# Patient Record
Sex: FEMALE | Race: WHITE
[De-identification: names, ages, dates, MRNs, and addresses within clinical notes are randomized per-mention and may not be internally consistent; named-entity substitution may affect disease eponyms.]

---

## 2019-04-09 ENCOUNTER — HOSPITAL ENCOUNTER (OUTPATIENT)
Dept: HOSPITAL 5 - OR | Age: 69
Discharge: HOME | End: 2019-04-09
Attending: UROLOGY
Payer: MEDICARE

## 2019-04-09 VITALS — SYSTOLIC BLOOD PRESSURE: 147 MMHG | DIASTOLIC BLOOD PRESSURE: 88 MMHG

## 2019-04-09 DIAGNOSIS — Z79.82: ICD-10-CM

## 2019-04-09 DIAGNOSIS — Z79.899: ICD-10-CM

## 2019-04-09 DIAGNOSIS — Z87.440: ICD-10-CM

## 2019-04-09 DIAGNOSIS — M19.90: ICD-10-CM

## 2019-04-09 DIAGNOSIS — K21.9: ICD-10-CM

## 2019-04-09 DIAGNOSIS — Z96.653: ICD-10-CM

## 2019-04-09 DIAGNOSIS — Z90.49: ICD-10-CM

## 2019-04-09 DIAGNOSIS — E66.9: ICD-10-CM

## 2019-04-09 DIAGNOSIS — Z90.710: ICD-10-CM

## 2019-04-09 DIAGNOSIS — Z88.8: ICD-10-CM

## 2019-04-09 DIAGNOSIS — Z53.8: ICD-10-CM

## 2019-04-09 DIAGNOSIS — N32.81: Primary | ICD-10-CM

## 2019-04-09 DIAGNOSIS — D64.9: ICD-10-CM

## 2019-04-09 DIAGNOSIS — J45.909: ICD-10-CM

## 2019-04-09 DIAGNOSIS — F32.9: ICD-10-CM

## 2019-04-09 DIAGNOSIS — I10: ICD-10-CM

## 2019-04-09 LAB
BASOPHILS # (AUTO): 0 K/MM3 (ref 0–0.1)
BASOPHILS NFR BLD AUTO: 0.5 % (ref 0–1.8)
BUN SERPL-MCNC: 21 MG/DL (ref 7–17)
BUN/CREAT SERPL: 21 %
CALCIUM SERPL-MCNC: 9.1 MG/DL (ref 8.4–10.2)
EOSINOPHIL # BLD AUTO: 0.4 K/MM3 (ref 0–0.4)
EOSINOPHIL NFR BLD AUTO: 6.2 % (ref 0–4.3)
HCT VFR BLD CALC: 37.6 % (ref 30.3–42.9)
HEMOLYSIS INDEX: 3
HGB BLD-MCNC: 12.5 GM/DL (ref 10.1–14.3)
LYMPHOCYTES # BLD AUTO: 1.6 K/MM3 (ref 1.2–5.4)
LYMPHOCYTES NFR BLD AUTO: 24.2 % (ref 13.4–35)
MCHC RBC AUTO-ENTMCNC: 33 % (ref 30–34)
MCV RBC AUTO: 91 FL (ref 79–97)
MONOCYTES # (AUTO): 0.6 K/MM3 (ref 0–0.8)
MONOCYTES % (AUTO): 9.4 % (ref 0–7.3)
PLATELET # BLD: 262 K/MM3 (ref 140–440)
RBC # BLD AUTO: 4.13 M/MM3 (ref 3.65–5.03)

## 2019-04-09 PROCEDURE — 85025 COMPLETE CBC W/AUTO DIFF WBC: CPT

## 2019-04-09 PROCEDURE — 36415 COLL VENOUS BLD VENIPUNCTURE: CPT

## 2019-04-09 PROCEDURE — 80048 BASIC METABOLIC PNL TOTAL CA: CPT

## 2019-04-09 NOTE — ANESTHESIA CONSULTATION
Anesthesia Consult and Med Hx


Date of service: 04/09/19





- Airway


Anesthetic Teeth Evaluation: Partials


ROM Head & Neck: Adequate


Mental/Hyoid Distance: Inadequate


Mallampati Class: Class III


Intubation Access Assessment: Possibly Difficult (significant kyphosis)





- Pulmonary Exam


CTA: Yes





- Cardiac Exam


Cardiac Exam: RRR





- Pre-Operative Health Status


ASA Pre-Surgery Classification: ASA3


Proposed Anesthetic Plan: General





- Pulmonary


Hx Smoking: No


Hx Asthma: Yes (triggered by allergies; last used inhaler today)


Hx Respiratory Symptoms: No


SOB: Yes (SOB WITH ACTIVITY)





- Cardiovascular System


Hx Hypertension: Yes


Hx Heart Attack/AMI: No


Hx Percutaneous Transluminal Coronary Angioplasty (PTCA): No


Hx Valvular Heart Disease: Yes (mild AR, mild to moderate MR)





- Central Nervous System


Hx Seizures: No


CVA: No


Hx Back Pain: Yes (2/2 lumbar compression fractures and osteopenia)


Hx Psychiatric Problems: Yes (depression)





- Gastrointestinal


Hx Gastroesophageal Reflux Disease: Yes (asymptomatic today)





- Endocrine


Hx Renal Disease: No


Hx Liver Disease: No


Hx Insulin Dependent Diabetes: No


Hx Non-Insulin Dependent Diabetes: No


Hx Thyroid Disease: No





- Hematic


Hx Anemia: Yes





- Other Systems


Hx Obesity: Yes





- Additional Comments


Anesthesia Medical History Comments: Cardiology clearance on chart. TTE 4/2018 

shows normal EF, mild AR, mild to moderate MR. ST 5/2018 negative. Recently 

started on metoprolol which she has tolerated well (last dose 4/8/19 PM).

## 2019-04-23 ENCOUNTER — HOSPITAL ENCOUNTER (OUTPATIENT)
Dept: HOSPITAL 5 - OR | Age: 69
Discharge: HOME | End: 2019-04-23
Attending: UROLOGY
Payer: MEDICARE

## 2019-04-23 VITALS — SYSTOLIC BLOOD PRESSURE: 138 MMHG | DIASTOLIC BLOOD PRESSURE: 79 MMHG

## 2019-04-23 DIAGNOSIS — Z79.899: ICD-10-CM

## 2019-04-23 DIAGNOSIS — N32.81: Primary | ICD-10-CM

## 2019-04-23 DIAGNOSIS — Z90.710: ICD-10-CM

## 2019-04-23 DIAGNOSIS — Z79.82: ICD-10-CM

## 2019-04-23 DIAGNOSIS — R35.0: ICD-10-CM

## 2019-04-23 DIAGNOSIS — Z88.2: ICD-10-CM

## 2019-04-23 DIAGNOSIS — Z98.890: ICD-10-CM

## 2019-04-23 DIAGNOSIS — J45.909: ICD-10-CM

## 2019-04-23 DIAGNOSIS — Z96.653: ICD-10-CM

## 2019-04-23 DIAGNOSIS — E66.9: ICD-10-CM

## 2019-04-23 DIAGNOSIS — D64.9: ICD-10-CM

## 2019-04-23 DIAGNOSIS — M19.90: ICD-10-CM

## 2019-04-23 DIAGNOSIS — Z90.49: ICD-10-CM

## 2019-04-23 DIAGNOSIS — Z87.440: ICD-10-CM

## 2019-04-23 DIAGNOSIS — I10: ICD-10-CM

## 2019-04-23 DIAGNOSIS — Z88.8: ICD-10-CM

## 2019-04-23 DIAGNOSIS — K21.9: ICD-10-CM

## 2019-04-23 DIAGNOSIS — F32.9: ICD-10-CM

## 2019-04-23 PROCEDURE — 64595 REV/RMV PRPH SAC/GSTR NPG/R: CPT

## 2019-04-23 PROCEDURE — C1751 CATH, INF, PER/CENT/MIDLINE: HCPCS

## 2019-04-23 PROCEDURE — 64585 REV/RMV PERPH NSTIM ELTRD RA: CPT

## 2019-04-23 PROCEDURE — 88300 SURGICAL PATH GROSS: CPT

## 2019-04-23 PROCEDURE — 88302 TISSUE EXAM BY PATHOLOGIST: CPT

## 2019-04-23 PROCEDURE — 73501 X-RAY EXAM HIP UNI 1 VIEW: CPT

## 2019-04-23 NOTE — ANESTHESIA DAY OF SURGERY
Anesthesia Day of Surgery





- Day of Surgery


Patient Examined: Yes


Patient H&P Reviewed: Yes


Patient is NPO: Yes


Beta Blockers: Yes (Yesterday PM)

## 2019-04-23 NOTE — SHORT STAY SUMMARY
Short Stay Documentation


Date of service: 04/23/19





- History


H&P: obtained from office





- Allergies and Medications


Current Medications: 


                                    Allergies





pregabalin [From Lyrica] Allergy (Verified 04/03/19 10:32)


   Hives


Sulfa (Sulfonamide Antibiotics) Allergy (Verified 04/03/19 10:32)


   Anaphylaxis





                                Home Medications











 Medication  Instructions  Recorded  Confirmed  Last Taken  Type


 


ALBUTEROL Inhaler (OR & NICU) 2 puff IH QID PRN 04/03/19 04/23/19 04/23/19 

History





[Proair]     


 


Aspirin [Aspir-Low] 81 mg PO DAILY 04/03/19 04/23/19 04/08/19 History


 


Calcium Carbonate [Calcium] 500 mg PO DAILY 04/03/19 04/23/19 04/22/19 History


 


Cholecalciferol (Vitamin D3) 50,000 unit PO QWEEK 04/03/19 04/23/19 04/19/19 

History





[Vitamin D3 2,000 UNIT CAP]     


 


Estradiol Vaginal Cream (Nf) 42.5 gm VG DAILY 04/03/19 04/19/19 04/08/19 History





[Estrace Vaginal Cream (Nf)]     


 


FLUoxetine [PROzac] 20 mg PO QDAY 04/03/19 04/19/19 04/08/19 History


 


Ferrous Sulfate [High Potency Iron 27 mg PO DAILY 04/03/19 04/23/19 04/08/19 

History





27 MG]     


 


Fluticasone [Flonase] 1 spray NS QDAY 04/03/19 04/23/19 04/23/19 History


 


Fluticasone/Vilanterol [Breo 1 each IH DAILY 04/03/19 04/23/19 04/23/19 History





Ellipta 100-25 Mcg INH]     


 


Gabapentin [Neurontin] 300 mg PO Q8HR 04/03/19 04/23/19 04/22/19 History


 


Loperamide [Imodium] 2 mg PO TID 04/03/19 04/23/19 04/21/19 History


 


Losartan [Cozaar] 50 mg PO BID 04/03/19 04/23/19 04/23/19 History


 


Magnesium Oxide [Magnesium] 500 mg PO DAILY 04/03/19 04/19/19 04/08/19 History


 


Metoprolol [Lopressor] 25 mg PO BID 04/03/19 04/23/19 04/22/19 History


 


Mirabegron [Myrbetriq] 25 mg PO QDAY 04/03/19 04/19/19 04/08/19 History


 


Montelukast [Singulair] 10 mg PO QPM 04/03/19 04/23/19 04/22/19 History


 


Multivit-Min/Iron/Folic/Lutein 1 each PO DAILY 04/03/19 04/19/19 04/08/19 

History





[Centrum Silver Women Tablet]     


 


Omeprazole 20 mg PO DAILY 04/03/19 04/23/19 04/22/19 History


 


Oxycodone HCl/Acetaminophen 1 each PO Q6HR PRN 04/03/19 04/23/19 04/22/19 

History





[Percocet 10/325 mg]     


 


Potassium Chloride [Klor-Con] 20 meq PO DAILY 04/03/19 04/23/19 04/22/19 History


 


Ranitidine HCl [Zantac 150 MG TAB] 150 mg PO BID 04/03/19 04/23/19 04/22/19 

History


 


hydroCHLOROthiazide [HCTZ] 25 mg PO QDAY 04/03/19 04/23/19 04/22/19 History








Active Medications





Cefazolin Sodium (Ancef/Sterile Water 2 Gm/20 Ml)  2 gm IV PREOP NR


   Stop: 04/23/19 17:30


Famotidine (Pepcid)  20 mg IV PREOP NR


   Stop: 04/23/19 17:30


   Last Admin: 04/23/19 14:50 Dose:  20 mg


   Documented by: 


Fentanyl (Sublimaze)  50 mcg IV Q5MIN PRN


   PRN Reason: Pain , Severe (7-10)


   Stop: 04/23/19 23:59


Lactated Ringer's (Lactated Ringers)  1,000 mls @ 75 mls/hr IV AS DIRECT KAYLEEN


   Stop: 04/23/19 19:00


   Last Admin: 04/23/19 14:50 Dose:  75 mls/hr


   Documented by: 


Midazolam HCl (Versed)  2 mg IV PREOP NR


   Stop: 04/23/19 17:30


   Last Admin: 04/23/19 14:58 Dose:  2 mg


   Documented by: 


Ondansetron HCl (Zofran)  4 mg IV ONCE PRN


   PRN Reason: Nausea And Vomiting











- Brief post op/procedure progress note


Date of procedure: 04/23/19


Pre-op diagnosis: OAB  / Interstim


Post-op diagnosis: same


Procedure: 





Removal of Interstim generator and lead 


Findings: 





entire lead all component and generator removed nothing left behind


Surgeon: MARILYN CLIFFORD


Estimated blood loss: minimal


Pathology: list (generator and lead)


Specimen disposition: to lab, other (generator)


Condition: stable





- Hospital course


Hospital course: 





orpacuhome





- Disposition


Condition at discharge: Good


Disposition: DC-01 TO HOME OR SELFCARE





Short Stay Discharge Plan


Activity: advance as tolerated


Diet: advance as tolerated


Follow up with: 


MARILYN CLIFFORD MD [Staff Physician] - 7 Days


Prescriptions: 


HYDROcodone/APAP 5-325 [Norco 5-325 mg TAB] 1 each PO Q4HR PRN #20 tablet


 PRN Reason: Pain

## 2019-04-23 NOTE — ANESTHESIA CONSULTATION
Anesthesia Consult and Med Hx





- Pulmonary


Hx Smoking: No


Hx Asthma: Yes (SEASONAL)


Hx Respiratory Symptoms: No


SOB: Yes (SOB WITH ACTIVITY)


Hx Sleep Apnea: No (JESSICA PRE SCREEN HIGH RISK)





- Cardiovascular System


Hx Hypertension: Yes


Hx Heart Attack/AMI: No


Hx Percutaneous Transluminal Coronary Angioplasty (PTCA): No


Hx Valvular Heart Disease: Yes (mild AR, mild to moderate MR)





- Central Nervous System


Hx Seizures: No


CVA: No


Hx Back Pain: Yes (2/2 lumbar compression fractures and osteopenia)





- Gastrointestinal


Hx Gastroesophageal Reflux Disease: Yes (asymptomatic today)





- Endocrine


Hx Renal Disease: No


Hx Liver Disease: No


Hx Insulin Dependent Diabetes: No


Hx Non-Insulin Dependent Diabetes: No


Hx Thyroid Disease: No





- Hematic


Hx Anemia: Yes





- Other Systems


Hx Cancer: No


Hx Obesity: Yes

## 2019-04-24 NOTE — FLUOROSCOPY REPORT
FLUOROSCOPY C-ARM/FLUOROSCOPY TRACKING



History: Stimulator removal.



Findings: Fluoroscopy was provided by radiology during removal of a 

neurostimulator device. 2 fluoroscopic images of the left hip region 

are presented demonstrating device removal removal. Please correlate 

with the procedural report by Dr. Castano as needed.

## 2019-04-30 NOTE — XRAY REPORT
LEFT HIP, ONE VIEW



History: Malfunctioning stimulator, removal of stimulator on left side.



Findings: Fluoroscopy was provided by radiology during a procedure by 

Dr. Castano. 2 fluoroscopic images of the left hip region are presented 

demonstrating removal of a stimulator device. No acute bony abnormality 

is detected. Please correlate with the procedural report as needed.



Impression: stimulator removal as described.

## 2019-05-02 NOTE — OPERATIVE REPORT
PREOPERATIVE DIAGNOSIS:  Overactive bladder.



POSTOPERATIVE DIAGNOSIS:  Overactive bladder.

 

PROCEDURE:  Removal of InterStim.



SURGEON:  Kobe Castano MD



ANESTHESIA:  General.



SPECIMENS:  None sent for pathology.



ESTIMATED BLOOD LOSS:  Minimal.



COMPLICATIONS:  None.



FINDINGS:  Complete removal of entire generator and the entire lead with none of

the device left behind.



CLINICAL INDICATIONS:  The patient was counseled on RCBA, antibiotics, SCDs. 

The patient's InterStim was no longer working, was implanted by another

urologist and she was treated by oral medications.



DESCRIPTION OF PROCEDURE:  Antibiotics, SCDs, transferred to OR suite in supine

position, anesthesia, prone position, prepped and draped, padded appropriately. 

At this point, our attention was taken to the left buttocks area.  An

approximately 2.5 cm incision was made overlying the previous incision,

dissected down to the InterStim generator.  This was pulled out, removed

hemostat placed on the lead, generator cut and removed.  At this point, with

fluoroscopic visualization, we identified the insertion into the sacral foramen.

 A small approximately 5 mm incision made.  We used curved devices including a

hemostat to pull up the lead.  This was then pulled through from the other side.

 At this point, we slowly dissected deep along the lead and then slowly removed.

 The lead was removed in its entirety with its tines and everything in place and

the distal tip in place and nothing left behind under fluoroscopy on

identification of the lead.  At this point, we irrigated both sides.  A 4-0

Vicryl suture was placed at the sacral incision.  A 3-0 chromic was placed in

the deeper layers interrupted and then interrupted 2-0 Vicryl was placed

subcutaneous and dermis along the larger buttocks incision.  The patient was

cleaned, dressing placed, awakened and transferred to PACU in good and stable

condition.





DD: 05/02/2019 06:56

DT: 05/02/2019 07:37

JOB# [id number]  [id number]

ATS/NTS